# Patient Record
Sex: FEMALE | Race: WHITE | NOT HISPANIC OR LATINO | URBAN - METROPOLITAN AREA
[De-identification: names, ages, dates, MRNs, and addresses within clinical notes are randomized per-mention and may not be internally consistent; named-entity substitution may affect disease eponyms.]

---

## 2019-11-10 ENCOUNTER — EMERGENCY (EMERGENCY)
Facility: HOSPITAL | Age: 64
LOS: 0 days | Discharge: HOME | End: 2019-11-10
Attending: STUDENT IN AN ORGANIZED HEALTH CARE EDUCATION/TRAINING PROGRAM
Payer: MEDICAID

## 2019-11-10 VITALS
DIASTOLIC BLOOD PRESSURE: 72 MMHG | RESPIRATION RATE: 18 BRPM | SYSTOLIC BLOOD PRESSURE: 129 MMHG | OXYGEN SATURATION: 95 % | HEART RATE: 104 BPM | TEMPERATURE: 98 F

## 2019-11-10 VITALS
DIASTOLIC BLOOD PRESSURE: 68 MMHG | HEART RATE: 102 BPM | TEMPERATURE: 98 F | OXYGEN SATURATION: 100 % | WEIGHT: 134.92 LBS | RESPIRATION RATE: 18 BRPM | SYSTOLIC BLOOD PRESSURE: 120 MMHG

## 2019-11-10 DIAGNOSIS — M54.9 DORSALGIA, UNSPECIFIED: ICD-10-CM

## 2019-11-10 DIAGNOSIS — E11.9 TYPE 2 DIABETES MELLITUS WITHOUT COMPLICATIONS: ICD-10-CM

## 2019-11-10 LAB — GLUCOSE BLDC GLUCOMTR-MCNC: 94 MG/DL — SIGNIFICANT CHANGE UP (ref 70–99)

## 2019-11-10 PROCEDURE — 99282 EMERGENCY DEPT VISIT SF MDM: CPT

## 2019-11-10 RX ORDER — ACETAMINOPHEN 500 MG
650 TABLET ORAL ONCE
Refills: 0 | Status: COMPLETED | OUTPATIENT
Start: 2019-11-10 | End: 2019-11-10

## 2019-11-10 RX ADMIN — Medication 650 MILLIGRAM(S): at 15:16

## 2019-11-10 RX ADMIN — Medication 650 MILLIGRAM(S): at 11:49

## 2019-11-10 NOTE — ED ADULT TRIAGE NOTE - CHIEF COMPLAINT QUOTE
pt BIBA with c/o running out of fentanyl and other opiates utilized to treat his "Bone cancer" here from pain control

## 2019-11-10 NOTE — ED PROVIDER NOTE - PATIENT PORTAL LINK FT
You can access the FollowMyHealth Patient Portal offered by [facility] by registering at the following website: [email or web address]. By joining [employer]’s FollowMyHealth portal, you will also be able to view your health information using other applications (apps) compatible with our system.

## 2019-11-10 NOTE — ED PROVIDER NOTE - PHYSICAL EXAMINATION
Gen: no acute distress, well appearing, awake, alert and oriented x 3  Cardiac: regular rate and rhythm, +S1S2  Pulm: Clear to auscultation bilaterally  Abd: soft, nontender, nondistended, no guarding  Back: neg CVA ttp, nontender spine  Extremity: no edema, no deformity, warm and well perfused, FROM all extremities    Neuro: awake, alert, oriented x 3, sensorimotor intact Gen: no acute distress, sleeping comfortably in stretcher, awake, alert and oriented x 3, disheveled.   Cardiac: regular rate and rhythm, +S1S2  Pulm: Clear to auscultation bilaterally  Abd: soft, nontender, nondistended, no guarding  Back: neg CVA ttp, nontender spine  Extremity: no edema, no deformity, warm and well perfused, FROM all extremities    Neuro: awake, alert, oriented x 3, sensorimotor intact

## 2019-11-10 NOTE — ED PROVIDER NOTE - PROGRESS NOTE DETAILS
Patient was seen and examined at bedside. Reports feeling much better after medications. Wants to be discharged home. Patient appears to be improved.

## 2019-11-10 NOTE — ED PROVIDER NOTE - CHIEF COMPLAINT
The patient is a 63y Female complaining of back pain general. The patient is a 63y Male complaining of back pain general.

## 2019-11-10 NOTE — ED PROVIDER NOTE - OBJECTIVE STATEMENT
reviewed iSTOP:  PT is prescribed fentanyl 12mg patches which was dispensed on 11/7/19. The prescription was for 10 patches over 30 days by Dr. Guerrero Neri. Pt is a 63 year old male with a PMHx of DM and lung CA who presents to the ED for back pain. Pt states that he is in pain secondary to his lung CA. His pain is in his lower back chronically and it is not different that his usual pain. He repots that he has run out of the fentanyl patches that his PMD prescribed x3 days ago. He does not currently take any chemotherapy treatment or radiation for his CA and does not follow with an oncologist. He denies any chest pain, SOB, abdominal pain, n/v/d, or headache. +Smoker and EOTH, denies use of other illict drugs or h/o alcohol abuse.    reviewed iSTOP:  PT is prescribed fentanyl 12mg patches which was dispensed on 11/7/19. The prescription was for 10 patches over 30 days by Dr. Guerrero Neri. Pt is a 63 year old male with a PMHx of DM and lung CA who presents to the ED for back pain. Pt states that he is in pain secondary to his lung CA. His pain is in his lower back chronically and it is not different that his usual pain. He repots that he has run out of the fentanyl patches that his PMD prescribed x3 days ago. He does not currently take any chemotherapy treatment or radiation for his CA and does not follow with an oncologist. He denies any chest pain, SOB, abdominal pain, n/v/d, or headache. +Smoker and EOTH, denies use of other illict drugs or h/o alcohol abuse. States he is here for pain management, food, and transportation.     reviewed iSTOP:  PT is prescribed fentanyl 12mg patches which was dispensed on 11/7/19. The prescription was for 10 patches over 30 days by Dr. Guerrero Neri. 63 year old male with a PMHx of DM and lung CA who presents to the ED for back pain. Pt states that he is in pain secondary to his lung CA. His pain is in his lower back chronically and it is not different that his usual pain. He repots that he has run out of the fentanyl patches that his PMD prescribed x3 days ago. He does not currently take any chemotherapy treatment or radiation for his CA and does not follow with an oncologist. He denies any chest pain, SOB, abdominal pain, n/v/d, or headache. +Smoker and EOTH, denies use of other illict drugs or h/o alcohol abuse. States he is here for pain management, food, and transportation.     reviewed iSTOP:  PT is prescribed fentanyl 12mg patches which was dispensed on 11/7/19. The prescription was for 10 patches over 30 days by Dr. Guerrero Neri.

## 2019-11-10 NOTE — ED ADULT NURSE REASSESSMENT NOTE - NS ED NURSE REASSESS COMMENT FT1
pt refused to sign, refused to leave, refused VS, security called and pt taken to waiting room, taxi called

## 2019-11-10 NOTE — ED ADULT NURSE NOTE - OBJECTIVE STATEMENT
Pt presented to the ed c/o of back pain. Pt has hx of bone ca. [place] Pt c/o of feeling tired and weak Pt presented to the ed c/o of back pain. Pt has hx of bone ca. Pt [place] that [place].. Pt c/o of feeling tired and weak

## 2019-11-10 NOTE — ED ADULT TRIAGE NOTE - STATUS:
Partially impaired: cannot see medication labels or newsprint, but can see obstacles in path, and the surrounding layout; can count fingers at arm's length
Applied

## 2019-11-10 NOTE — ED PROVIDER NOTE - CARE PROVIDER_API CALL
Misty Guy (MD)  Medical Oncology  [address], [address]  [place] [address]  Phone: [phone]  Fax: [phone]  Follow Up Time: 1-3 Days

## 2019-11-10 NOTE — ED ADULT NURSE NOTE - NSIMPLEMENTINTERV_GEN_ALL_ED
Implemented All Universal Safety Interventions:  [place] to call system. Call bell, personal items and telephone within reach. Instruct patient to call for assistance. Room bathroom lighting operational. Non-slip footwear when patient is off stretcher. Physically safe environment: no spills, clutter or unnecessary equipment. Stretcher in lowest position, wheels locked, appropriate side rails in place.

## 2019-11-10 NOTE — ED PROVIDER NOTE - NS ED ROS FT
Constitutional: no fevers or chills  Cardiac: no palpitations, chest pain  Lungs: no shortness of breath, wheezes  Abd: no abd pain, nausea, vomiting, diarrhea  Genitourinary: no dysuria, increased urinary frequency, hematuria  Neurology: no sensorimotor deficits, no dizziness, no headache, no visual changes  Skin: no rashes  All other ROS negative except as per HPI Constitutional: no fevers or chills  Cardiac: no palpitations, chest pain  Lungs: no shortness of breath, wheezes  Abd: no abd pain, nausea, vomiting, diarrhea  Genitourinary: no dysuria, increased urinary frequency, hematuria  Musculoskeletal: +chronic lower back pain.  Neurology: no sensorimotor deficits, no dizziness, no headache, no visual changes  Skin: no rashes  All other ROS negative except as per HPI
